# Patient Record
Sex: MALE | Race: WHITE | NOT HISPANIC OR LATINO | URBAN - METROPOLITAN AREA
[De-identification: names, ages, dates, MRNs, and addresses within clinical notes are randomized per-mention and may not be internally consistent; named-entity substitution may affect disease eponyms.]

---

## 2021-05-04 NOTE — PATIENT DISCUSSION
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

## 2022-07-12 ENCOUNTER — ESTABLISHED PATIENT (OUTPATIENT)
Facility: LOCATION | Age: 77
End: 2022-07-12

## 2022-07-12 DIAGNOSIS — H25.13: ICD-10-CM

## 2022-07-12 DIAGNOSIS — H52.03: ICD-10-CM

## 2022-07-12 PROCEDURE — 92015 DETERMINE REFRACTIVE STATE: CPT

## 2022-07-12 PROCEDURE — 92014 COMPRE OPH EXAM EST PT 1/>: CPT

## 2022-07-12 ASSESSMENT — VISUAL ACUITY
OS_CC: J1 -2
OS_BAT: 20/100
OD_PH: 20/30-2
OD_CC: 20/40+2
OD_CC: J1+ -2
OD_BAT: 20/80
OS_CC: 20/25-1

## 2022-07-12 ASSESSMENT — TONOMETRY
OS_IOP_MMHG: 16
OD_IOP_MMHG: 17

## 2022-07-13 ENCOUNTER — CONSULTATION/EVALUATION (OUTPATIENT)
Facility: LOCATION | Age: 77
End: 2022-07-13

## 2022-07-13 DIAGNOSIS — H04.123: ICD-10-CM

## 2022-07-13 DIAGNOSIS — H25.813: ICD-10-CM

## 2022-07-13 PROCEDURE — 92136 OPHTHALMIC BIOMETRY: CPT

## 2022-07-13 PROCEDURE — 99214 OFFICE O/P EST MOD 30 MIN: CPT

## 2022-07-13 PROCEDURE — 92025 CPTRIZED CORNEAL TOPOGRAPHY: CPT

## 2022-07-13 PROCEDURE — 92134 CPTRZ OPH DX IMG PST SGM RTA: CPT

## 2022-07-13 ASSESSMENT — VISUAL ACUITY
OS_CC: 20/25
OU_SC: 20/20-1
OU_CC: 20/25
OD_CC: 20/40
OS_BAT: 20/100
OS_SC: 20/25
OD_SC: 20/25-2
OD_SC: J5
OS_SC: J3
OD_BAT: 20/80
OU_SC: J2
OD_CC: J1+
OU_CC: J1+
OS_CC: J1

## 2022-07-13 ASSESSMENT — TONOMETRY
OD_IOP_MMHG: 14
OS_IOP_MMHG: 14

## 2022-09-21 ENCOUNTER — SURGERY/PROCEDURE (OUTPATIENT)
Facility: LOCATION | Age: 77
End: 2022-09-21

## 2022-09-21 DIAGNOSIS — H25.811: ICD-10-CM

## 2022-09-21 PROCEDURE — 66984 XCAPSL CTRC RMVL W/O ECP: CPT

## 2022-09-22 ENCOUNTER — POST-OP (OUTPATIENT)
Facility: LOCATION | Age: 77
End: 2022-09-22

## 2022-09-22 DIAGNOSIS — Z96.1: ICD-10-CM

## 2022-09-22 DIAGNOSIS — H59.021: ICD-10-CM

## 2022-09-22 PROCEDURE — 99024 POSTOP FOLLOW-UP VISIT: CPT

## 2022-09-22 RX ORDER — PREDNISOLONE ACETATE 10 MG/ML: 1 SUSPENSION/ DROPS OPHTHALMIC TWICE A DAY

## 2022-09-22 RX ORDER — DORZOLAMIDE HYDROCHLORIDE AND TIMOLOL MALEATE 20; 5 MG/ML; MG/ML: 1 SOLUTION/ DROPS OPHTHALMIC TWICE A DAY

## 2022-09-22 ASSESSMENT — VISUAL ACUITY
OS_SC: 20/30-2
OD_SC: 20/40-2

## 2022-09-22 ASSESSMENT — TONOMETRY
OS_IOP_MMHG: 13
OD_IOP_MMHG: 34

## 2022-09-28 ENCOUNTER — POST-OP (OUTPATIENT)
Facility: LOCATION | Age: 77
End: 2022-09-28

## 2022-09-28 DIAGNOSIS — Z96.1: ICD-10-CM

## 2022-09-28 DIAGNOSIS — H59.021: ICD-10-CM

## 2022-09-28 PROCEDURE — 99024 POSTOP FOLLOW-UP VISIT: CPT

## 2022-09-28 ASSESSMENT — TONOMETRY
OS_IOP_MMHG: 14
OS_IOP_MMHG: 13
OD_IOP_MMHG: 10
OD_IOP_MMHG: 9

## 2022-09-28 ASSESSMENT — VISUAL ACUITY
OS_SC: 20/25
OD_SC: 20/25

## 2022-10-05 ENCOUNTER — SURGERY/PROCEDURE (OUTPATIENT)
Facility: LOCATION | Age: 77
End: 2022-10-05

## 2022-10-05 DIAGNOSIS — H25.812: ICD-10-CM

## 2022-10-05 PROCEDURE — 66984 XCAPSL CTRC RMVL W/O ECP: CPT

## 2022-10-06 ENCOUNTER — POST-OP (OUTPATIENT)
Facility: LOCATION | Age: 77
End: 2022-10-06

## 2022-10-06 DIAGNOSIS — Z96.1: ICD-10-CM

## 2022-10-06 DIAGNOSIS — H59.021: ICD-10-CM

## 2022-10-06 PROCEDURE — 99024 POSTOP FOLLOW-UP VISIT: CPT

## 2022-10-06 ASSESSMENT — VISUAL ACUITY
OS_SC: 20/30-1
OD_SC: 20/30

## 2022-10-06 ASSESSMENT — TONOMETRY
OD_IOP_MMHG: 8
OS_IOP_MMHG: 20

## 2022-10-14 ENCOUNTER — POST-OP (OUTPATIENT)
Facility: LOCATION | Age: 77
End: 2022-10-14

## 2022-10-14 DIAGNOSIS — H59.021: ICD-10-CM

## 2022-10-14 DIAGNOSIS — Z96.1: ICD-10-CM

## 2022-10-14 PROCEDURE — 99024 POSTOP FOLLOW-UP VISIT: CPT

## 2022-10-14 ASSESSMENT — TONOMETRY
OS_IOP_MMHG: 14
OD_IOP_MMHG: 8

## 2022-10-14 ASSESSMENT — VISUAL ACUITY
OD_SC: 20/25
OS_SC: 20/25+2

## 2022-10-27 ENCOUNTER — POST-OP (OUTPATIENT)
Facility: LOCATION | Age: 77
End: 2022-10-27

## 2022-10-27 DIAGNOSIS — Z96.1: ICD-10-CM

## 2022-10-27 DIAGNOSIS — H59.021: ICD-10-CM

## 2022-10-27 PROCEDURE — 99024 POSTOP FOLLOW-UP VISIT: CPT

## 2022-10-27 ASSESSMENT — TONOMETRY
OS_IOP_MMHG: 13
OD_IOP_MMHG: 12

## 2022-10-27 ASSESSMENT — VISUAL ACUITY
OD_SC: 20/20
OS_SC: 20/20

## 2022-11-18 ENCOUNTER — POST-OP (OUTPATIENT)
Facility: LOCATION | Age: 77
End: 2022-11-18

## 2022-11-18 DIAGNOSIS — H59.021: ICD-10-CM

## 2022-11-18 DIAGNOSIS — Z96.1: ICD-10-CM

## 2022-11-18 PROCEDURE — 99024 POSTOP FOLLOW-UP VISIT: CPT

## 2022-11-18 ASSESSMENT — TONOMETRY
OS_IOP_MMHG: 12
OD_IOP_MMHG: 12

## 2022-11-18 ASSESSMENT — VISUAL ACUITY
OD_SC: 20/20
OS_SC: 20/20-
OU_SC: 20/20

## 2022-12-22 ENCOUNTER — POST-OP (OUTPATIENT)
Facility: LOCATION | Age: 77
End: 2022-12-22

## 2022-12-22 PROCEDURE — 99024 POSTOP FOLLOW-UP VISIT: CPT

## 2022-12-22 ASSESSMENT — TONOMETRY
OD_IOP_MMHG: 9
OS_IOP_MMHG: 10

## 2022-12-22 ASSESSMENT — VISUAL ACUITY
OD_SC: 20/20
OS_SC: 20/20-2

## 2023-08-25 ENCOUNTER — ESTABLISHED PATIENT (OUTPATIENT)
Facility: LOCATION | Age: 78
End: 2023-08-25

## 2023-08-25 DIAGNOSIS — Z96.1: ICD-10-CM

## 2023-08-25 PROCEDURE — 92014 COMPRE OPH EXAM EST PT 1/>: CPT

## 2023-08-25 ASSESSMENT — VISUAL ACUITY
OD_SC: 20/25
OD_CC: J1+
OS_CC: J1+
OS_SC: 20/20

## 2023-08-25 ASSESSMENT — TONOMETRY
OD_IOP_MMHG: 15
OS_IOP_MMHG: 16

## 2024-02-09 ENCOUNTER — ESTABLISHED PATIENT (OUTPATIENT)
Facility: LOCATION | Age: 79
End: 2024-02-09

## 2024-02-09 VITALS — BODY MASS INDEX: 26.07 KG/M2 | WEIGHT: 172 LBS | HEIGHT: 68 IN

## 2024-02-09 DIAGNOSIS — Z96.1: ICD-10-CM

## 2024-02-09 DIAGNOSIS — H26.491: ICD-10-CM

## 2024-02-09 PROCEDURE — 99213 OFFICE O/P EST LOW 20 MIN: CPT

## 2024-02-09 ASSESSMENT — VISUAL ACUITY
OS_SC: 20/20
OD_SC: 20/20

## 2024-02-09 ASSESSMENT — TONOMETRY
OD_IOP_MMHG: 12
OS_IOP_MMHG: 15

## 2025-03-03 ENCOUNTER — COMPREHENSIVE EXAM (OUTPATIENT)
Age: 80
End: 2025-03-03

## 2025-03-03 DIAGNOSIS — H26.491: ICD-10-CM

## 2025-03-03 DIAGNOSIS — Z96.1: ICD-10-CM

## 2025-03-03 PROCEDURE — 92014 COMPRE OPH EXAM EST PT 1/>: CPT
